# Patient Record
Sex: MALE | Race: BLACK OR AFRICAN AMERICAN | NOT HISPANIC OR LATINO | URBAN - METROPOLITAN AREA
[De-identification: names, ages, dates, MRNs, and addresses within clinical notes are randomized per-mention and may not be internally consistent; named-entity substitution may affect disease eponyms.]

---

## 2022-02-10 ENCOUNTER — HISTORICAL (OUTPATIENT)
Dept: ADMINISTRATIVE | Facility: HOSPITAL | Age: 62
End: 2022-02-10

## 2025-06-21 ENCOUNTER — OFFICE VISIT (OUTPATIENT)
Dept: URGENT CARE | Facility: CLINIC | Age: 65
End: 2025-06-21
Payer: COMMERCIAL

## 2025-06-21 ENCOUNTER — TELEPHONE (OUTPATIENT)
Dept: URGENT CARE | Facility: CLINIC | Age: 65
End: 2025-06-21

## 2025-06-21 VITALS
BODY MASS INDEX: 36.57 KG/M2 | RESPIRATION RATE: 18 BRPM | SYSTOLIC BLOOD PRESSURE: 157 MMHG | TEMPERATURE: 99 F | HEIGHT: 72 IN | OXYGEN SATURATION: 96 % | HEART RATE: 75 BPM | DIASTOLIC BLOOD PRESSURE: 97 MMHG | WEIGHT: 270 LBS

## 2025-06-21 DIAGNOSIS — J01.00 SUBACUTE MAXILLARY SINUSITIS: Primary | ICD-10-CM

## 2025-06-21 DIAGNOSIS — F41.9 ANXIETY: Primary | ICD-10-CM

## 2025-06-21 DIAGNOSIS — J01.01 ACUTE RECURRENT MAXILLARY SINUSITIS: ICD-10-CM

## 2025-06-21 LAB
EKG 12-LEAD: NORMAL
PR INTERVAL: NORMAL
PRT AXES: NORMAL
QRS DURATION: NORMAL
QT/QTC: NORMAL
VENTRICULAR RATE: NORMAL

## 2025-06-21 PROCEDURE — 99202 OFFICE O/P NEW SF 15 MIN: CPT | Mod: 25,,, | Performed by: NURSE PRACTITIONER

## 2025-06-21 PROCEDURE — 93000 ELECTROCARDIOGRAM COMPLETE: CPT | Mod: ,,, | Performed by: NURSE PRACTITIONER

## 2025-06-21 RX ORDER — FLUTICASONE PROPIONATE 50 MCG
SPRAY, SUSPENSION (ML) NASAL
COMMUNITY
Start: 2025-06-20

## 2025-06-21 RX ORDER — METHYLPREDNISOLONE 4 MG/1
TABLET ORAL
Qty: 21 EACH | Refills: 0 | Status: SHIPPED | OUTPATIENT
Start: 2025-06-21 | End: 2025-06-21

## 2025-06-21 RX ORDER — AMOXICILLIN 500 MG/1
CAPSULE ORAL
COMMUNITY
Start: 2025-05-13

## 2025-06-21 RX ORDER — AMOXICILLIN 500 MG/1
500 CAPSULE ORAL 3 TIMES DAILY
Qty: 30 CAPSULE | Refills: 0 | Status: SHIPPED | OUTPATIENT
Start: 2025-06-21 | End: 2025-07-01

## 2025-06-21 RX ORDER — IBUPROFEN 800 MG/1
800 TABLET, FILM COATED ORAL 2 TIMES DAILY
COMMUNITY
Start: 2025-01-10

## 2025-06-21 RX ORDER — HYDROCODONE BITARTRATE AND ACETAMINOPHEN 7.5; 325 MG/1; MG/1
TABLET ORAL
COMMUNITY
Start: 2025-05-13

## 2025-06-21 RX ORDER — METHYLPREDNISOLONE 4 MG/1
TABLET ORAL
Qty: 21 EACH | Refills: 0 | Status: SHIPPED | OUTPATIENT
Start: 2025-06-21

## 2025-06-21 RX ORDER — AMOXICILLIN 500 MG/1
500 CAPSULE ORAL 3 TIMES DAILY
Qty: 30 CAPSULE | Refills: 0 | Status: SHIPPED | OUTPATIENT
Start: 2025-06-21 | End: 2025-06-21

## 2025-06-21 NOTE — PROGRESS NOTES
Subjective:      Patient ID: Sergio VERA Living is a 65 y.o. male.    Vitals:  height is 6' (1.829 m) and weight is 122.5 kg (270 lb). His temperature is 98.7 °F (37.1 °C). His blood pressure is 152/94 (abnormal) and his pulse is 75. His respiration is 18 and oxygen saturation is 96%.     Chief Complaint: Hypertension     Patient is a 65 y.o. male who presents to urgent care with complaints of feeling anxious, went to urgent care last week was given Sudafed and saline for sinus congestion.  Also Afrin nasal spray.  Went to ENT yesterday has been using Flonase nasal spray.  Patient has been feeling like he's speeding. No hx of hypertension     Hypertension      Psychiatric/Behavioral:  Positive for agitation (Intermittent, none now).       Objective:     Physical Exam   Constitutional: He is oriented to person, place, and time. He appears well-developed.   HENT:   Head: Normocephalic and atraumatic.   Ears:   Right Ear: External ear normal.   Left Ear: External ear normal.   Nose: Nose normal.   Mouth/Throat: Mucous membranes are normal.   Eyes: Conjunctivae and lids are normal.   Neck: Trachea normal. Neck supple.   Cardiovascular: Normal rate, regular rhythm, normal heart sounds and normal pulses.   Pulmonary/Chest: Effort normal and breath sounds normal. No respiratory distress.   Abdominal: Normal appearance and bowel sounds are normal. He exhibits no distension and no mass. Soft. There is no abdominal tenderness.   Musculoskeletal: Normal range of motion.         General: Normal range of motion.   Neurological: He is alert and oriented to person, place, and time. He has normal strength.   Skin: Skin is warm, dry, intact, not diaphoretic and not pale.   Psychiatric: His speech is normal and behavior is normal. Judgment and thought content normal.   Nursing note and vitals reviewed.    Previous History:     Review of patient's allergies indicates:  No Known Allergies    Past Medical History:   Diagnosis Date    Pain       Current Outpatient Medications   Medication Instructions    amoxicillin (AMOXIL) 500 MG capsule take 1 capsule by mouth 3 times a day until all taken for infection    fluticasone propionate (FLONASE) 50 mcg/actuation nasal spray by Each Nostril route.    HYDROcodone-acetaminophen (NORCO) 7.5-325 mg per tablet take 1 tablet (with 400mg otc ibuprofen) every 6 hours as needed for pain    ibuprofen (ADVIL,MOTRIN) 800 mg, 2 times daily     Past Surgical History:   Procedure Laterality Date    FRACTURE SURGERY  2006    facial     Family History   Problem Relation Name Age of Onset    Hypertension Mother      Cancer Mother      Cancer Father      Hypertension Sister      Cancer Brother         Social History[1]  Assessment:     1. Anxiety    2. Acute recurrent maxillary sinusitis        Plan:   EKG normal sinus rhythm heartbeats 60.  Patient states feels better no chest pain anxiety resolved.  Continue recommended treatment by ENT, nasal saline and Flonase nasal spray as directed  Stop using Afrin nasal spray  Take Tylenol or ibuprofen OTC for fever and pain as directed   follow up with your primary care provider within 2-5 days if your signs and symptoms have not resolved or worsen.   If condition worsens or fails to improve we recommend that you receive another evaluation with your primary medical clinic to discuss your concerns.    Go to ER for anxiety, chest pain, or feelings of feeling speedy as you described  Also follow up with ENT as planned    Anxiety  -     POCT EKG 12-LEAD (NOT FOR [facility] USE)    Acute recurrent maxillary sinusitis                         [1]   Social History  Tobacco Use    Smoking status: Never    Smokeless tobacco: Never   Substance Use Topics    Alcohol use: Yes    Drug use: Never

## 2025-06-21 NOTE — PATIENT INSTRUCTIONS
Continue recommended treatment by ENT, nasal saline and Flonase nasal spray as directed  Stop using Afrin nasal spray  Take Tylenol or ibuprofen OTC for fever and pain as directed   follow up with your primary care provider within 2-5 days if your signs and symptoms have not resolved or worsen.   If condition worsens or fails to improve we recommend that you receive another evaluation with your primary medical clinic to discuss your concerns.    Go to ER for anxiety, chest pain, or feelings of feeling speedy as you described  Also follow up with ENT as planned

## 2025-07-02 ENCOUNTER — HOSPITAL ENCOUNTER (EMERGENCY)
Facility: HOSPITAL | Age: 65
Discharge: HOME | End: 2025-07-02
Attending: INTERNAL MEDICINE
Payer: COMMERCIAL

## 2025-07-02 VITALS
SYSTOLIC BLOOD PRESSURE: 140 MMHG | HEART RATE: 85 BPM | DIASTOLIC BLOOD PRESSURE: 84 MMHG | WEIGHT: 270 LBS | BODY MASS INDEX: 36.57 KG/M2 | OXYGEN SATURATION: 97 % | HEIGHT: 72 IN | TEMPERATURE: 98 F | RESPIRATION RATE: 18 BRPM

## 2025-07-02 DIAGNOSIS — J30.9 ALLERGIC RHINITIS, UNSPECIFIED SEASONALITY, UNSPECIFIED TRIGGER: ICD-10-CM

## 2025-07-02 DIAGNOSIS — R09.81 NASAL CONGESTION: Primary | ICD-10-CM

## 2025-07-02 PROCEDURE — 99283 EMERGENCY DEPT VISIT LOW MDM: CPT

## 2025-07-02 RX ORDER — LEVOCETIRIZINE DIHYDROCHLORIDE 5 MG/1
5 TABLET, FILM COATED ORAL NIGHTLY
Qty: 30 TABLET | Refills: 11 | Status: SHIPPED | OUTPATIENT
Start: 2025-07-02 | End: 2026-07-02

## 2025-07-02 RX ORDER — HYDROXYZINE HYDROCHLORIDE 25 MG/1
25 TABLET, FILM COATED ORAL EVERY 6 HOURS PRN
Qty: 20 TABLET | Refills: 0 | Status: SHIPPED | OUTPATIENT
Start: 2025-07-02

## 2025-07-02 RX ORDER — FLUTICASONE PROPIONATE 50 MCG
1 SPRAY, SUSPENSION (ML) NASAL DAILY
Qty: 11 ML | Refills: 3 | Status: SHIPPED | OUTPATIENT
Start: 2025-07-02

## 2025-07-02 NOTE — ED PROVIDER NOTES
Encounter Date: 7/2/2025       History     Chief Complaint   Patient presents with    Sinus Problem     Pt c/o congestion and sinus problems x2 weeks ago. Recently seen for same problem  and prescribe nasal spay.  Denies cough and sore throat. Hx of deviated septum. Recently on abx for ingrown toenail      64 yo male with nasal congestion x 2 weeks. Has been on oral steroids, Amoxicillin and Keflex and nasal spray but not improving. Admits he only used nasal spray for 3 days and stopped because it wasn't helping. Denies fever, drainage, cough    The history is provided by the patient.     Review of patient's allergies indicates:  No Known Allergies  Past Medical History:   Diagnosis Date    Pain      Past Surgical History:   Procedure Laterality Date    FRACTURE SURGERY  2006    facial     Family History   Problem Relation Name Age of Onset    Hypertension Mother      Cancer Mother      Cancer Father      Hypertension Sister      Cancer Brother       Social History[1]  Review of Systems   Constitutional:  Negative for fever.   HENT:  Positive for congestion. Negative for ear discharge, rhinorrhea and sore throat.    Respiratory:  Negative for cough.        Physical Exam     Initial Vitals [07/02/25 0753]   BP Pulse Resp Temp SpO2   (!) 163/91 102 16 98.1 °F (36.7 °C) 98 %      MAP       --         Physical Exam    Nursing note and vitals reviewed.  Constitutional: He appears well-developed and well-nourished. He is not diaphoretic. No distress.   HENT:   Head: Normocephalic and atraumatic.   Right Ear: External ear normal.   Left Ear: External ear normal.   Nose: Mucosal edema present. Right sinus exhibits no maxillary sinus tenderness and no frontal sinus tenderness. Left sinus exhibits no maxillary sinus tenderness and no frontal sinus tenderness.   Eyes: Conjunctivae are normal.   Neck: Neck supple.   Normal range of motion.  Cardiovascular:  Normal rate.           Pulmonary/Chest: Breath sounds normal. No  respiratory distress.   Abdominal: He exhibits no distension.   Musculoskeletal:         General: Normal range of motion.      Cervical back: Normal range of motion and neck supple.     Neurological: He is alert and oriented to person, place, and time. GCS score is 15. GCS eye subscore is 4. GCS verbal subscore is 5. GCS motor subscore is 6.   Skin: Skin is warm and dry. No pallor.   Psychiatric: He has a normal mood and affect.         ED Course   Procedures  Labs Reviewed - No data to display       Imaging Results    None          Medications - No data to display  Medical Decision Making  DDX includes but not limited to sinus infection, rhinitis     On exam, nasal turbinates are swollen, erythematous and boggy. No discharge. No sinus tenderness  Recommend patient start back flonase   Adding Xyzal and Hydroxyzine PRN as there appears to be an anxiety component   Recommended saline rinses     Problems Addressed:  Allergic rhinitis, unspecified seasonality, unspecified trigger: acute illness or injury  Nasal congestion: acute illness or injury    Risk  Prescription drug management.                                      Clinical Impression:  Final diagnoses:  [R09.81] Nasal congestion (Primary)  [J30.9] Allergic rhinitis, unspecified seasonality, unspecified trigger          ED Disposition Condition    Discharge Stable          ED Prescriptions       Medication Sig Dispense Start Date End Date Auth. Provider    fluticasone propionate (FLONASE) 50 mcg/actuation nasal spray 1 spray (50 mcg total) by Each Nostril route once daily. 11 mL 7/2/2025 -- Selma Solo MD    levocetirizine (XYZAL) 5 MG tablet Take 1 tablet (5 mg total) by mouth every evening. 30 tablet 7/2/2025 7/2/2026 Selma Solo MD    hydrOXYzine HCL (ATARAX) 25 MG tablet Take 1 tablet (25 mg total) by mouth every 6 (six) hours as needed for Itching or Anxiety. 20 tablet 7/2/2025 -- Selma Solo MD          Follow-up Information       Follow up  With Specialties Details Why Contact Info    Tong Jeronimo MD Otolaryngology Schedule an appointment as soon as possible for a visit   [address]   [address]  [facility] [address]  [phone]      [facility] - Emergency Dept Emergency Medicine  As needed, If symptoms worsen [address] [facility] [place] [id number] [phone]                   [1]   Social History  Tobacco Use    Smoking status: Never    Smokeless tobacco: Never   Substance Use Topics    Alcohol use: Yes    Drug use: Never        Selma Solo MD  07/02/25 0800